# Patient Record
Sex: FEMALE | Race: WHITE | NOT HISPANIC OR LATINO | Employment: FULL TIME | URBAN - METROPOLITAN AREA
[De-identification: names, ages, dates, MRNs, and addresses within clinical notes are randomized per-mention and may not be internally consistent; named-entity substitution may affect disease eponyms.]

---

## 2019-04-29 ENCOUNTER — OFFICE VISIT (OUTPATIENT)
Dept: URGENT CARE | Facility: URGENT CARE | Age: 34
End: 2019-04-29
Payer: COMMERCIAL

## 2019-04-29 VITALS
OXYGEN SATURATION: 98 % | HEART RATE: 79 BPM | DIASTOLIC BLOOD PRESSURE: 74 MMHG | TEMPERATURE: 99.4 F | SYSTOLIC BLOOD PRESSURE: 114 MMHG | WEIGHT: 174 LBS

## 2019-04-29 DIAGNOSIS — R52 ACHES: Primary | ICD-10-CM

## 2019-04-29 LAB
FLUAV+FLUBV AG SPEC QL: NEGATIVE
FLUAV+FLUBV AG SPEC QL: NEGATIVE
SPECIMEN SOURCE: NORMAL

## 2019-04-29 PROCEDURE — 87804 INFLUENZA ASSAY W/OPTIC: CPT | Performed by: FAMILY MEDICINE

## 2019-04-29 PROCEDURE — 99203 OFFICE O/P NEW LOW 30 MIN: CPT | Performed by: FAMILY MEDICINE

## 2019-04-30 NOTE — PATIENT INSTRUCTIONS
If you develop a cough try robitussin DM     Stay hydrated: 4-6 water bottles a day    Take ibuprofen 600mg every 4-6 hours for fevers.     If fevers persist more than 2 days please call you doctor's office for a recommendation    If you see swelling/redness in the groin area please come back to be seen    Return or call if you have new or worsening symptoms

## 2019-04-30 NOTE — PROGRESS NOTES
Subjective:   Arminda Veloz is a 33 year old female who presents for   Chief Complaint   Patient presents with     Urgent Care     Pt in clinic to have eval for sudden onset of fever, aches, and      Generalized Body Aches     Fever     Sweats     Woke up this morning and felt some discomfort in her muscles of lower right abdomen. Developing achiness. Getting home tonight around 530pm she felt achey. Chills ensued. She took a shower and took a 2 hour rest while awake . Denies vomiting/diarrhea. She denies cough. No sore throat. Denies sensitivity to light. No neck pain.   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 Slight groin discomfort on R side earlier tonight that has resolved. Denies any recent increase in activity.No difficulty with running/walking      There are no active problems to display for this patient.    No current outpatient medications on file.     No current facility-administered medications for this visit.      ROS:  As above per HPI    Objective:   /74   Pulse 79   Temp 99.4  F (37.4  C) (Oral)   Wt 78.9 kg (174 lb)   SpO2 98% , There is no height or weight on file to calculate BMI.  Gen:  NAD, well-nourished, sitting in chair comfortably  HEENT: EOMI, sclera anicteric, Head normocephalic, ; nares patent; moist mucous membranes, normal oropharynx without tonsillar enlargement  Neck: trachea midline, no thyromegaly, normal ROM and no nuchal rigidity  CV:  Hemodynamically stable, RRR  Pulm:  no increased work of breathing , CTAB, no wheezes/rales/rhonchi   Extrem: no cyanosis, edema or clubbing  Skin: no obvious rashes or abnormalities  Psych: Euthymic, linear thoughts, normal rate of speech  MSK: 5/5 hip flexion  Gait: normal        Results for orders placed or performed in visit on 04/29/19   Influenza A/B antigen   Result Value Ref Range    Influenza A/B Agn Specimen Nasal     Influenza A  Negative NEG^Negative    Influenza B Negative NEG^Negative       Assessment & Plan:   Arminda Veloz, 33 year old female who presents with:    Body Aches  Maybe experiencing a mild viral illness - showing no respiratory symptoms. Vital signs wnl although she did have ibuprofen recently. Appears well hydrated with good mentation. Care tips provided in AVS on when to return for re-evaluation.   - Influenza A/B antigen      Roberth Lane MD   [facility]    The use of Dragon/[employer] dictation services may have been used to construct the content in this note; any grammatical or spelling errors are non-intentional. Please contact the author of this note directly if you are in need of any clarification.

## 2019-09-11 ENCOUNTER — OFFICE VISIT (OUTPATIENT)
Dept: URGENT CARE | Facility: URGENT CARE | Age: 34
End: 2019-09-11
Payer: COMMERCIAL

## 2019-09-11 VITALS
OXYGEN SATURATION: 98 % | WEIGHT: 174 LBS | TEMPERATURE: 98.5 F | SYSTOLIC BLOOD PRESSURE: 128 MMHG | DIASTOLIC BLOOD PRESSURE: 71 MMHG | HEART RATE: 72 BPM

## 2019-09-11 DIAGNOSIS — J02.9 VIRAL PHARYNGITIS: Primary | ICD-10-CM

## 2019-09-11 DIAGNOSIS — R07.0 THROAT PAIN: ICD-10-CM

## 2019-09-11 LAB
DEPRECATED S PYO AG THROAT QL EIA: NORMAL
SPECIMEN SOURCE: NORMAL

## 2019-09-11 PROCEDURE — 87081 CULTURE SCREEN ONLY: CPT | Performed by: INTERNAL MEDICINE

## 2019-09-11 PROCEDURE — 99213 OFFICE O/P EST LOW 20 MIN: CPT | Performed by: INTERNAL MEDICINE

## 2019-09-11 PROCEDURE — 87880 STREP A ASSAY W/OPTIC: CPT | Performed by: INTERNAL MEDICINE

## 2019-09-11 NOTE — PROGRESS NOTES
SUBJECTIVE:   Arminda Veloz is a 34 year old female presenting with a chief complaint of   Chief Complaint   Patient presents with     Urgent Care     Pharyngitis     c/o sore throat for 1 day       She is an established patient of [facility].    URI Adult    Onset of symptoms was 1 day(s) ago.  Course of illness is worsening.      Current and Associated symptoms: sore throat, headache and fatigue  Treatment measures tried include Tylenol/Ibuprofen.  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        Review of Systems    No past medical history on file.  No family history on file.  No current outpatient medications on file.     Social History     Tobacco Use     Smoking status: Never Smoker     Smokeless tobacco: Never Used   Substance Use Topics     Alcohol use: Not on file       OBJECTIVE  /71   Pulse 72   Temp 98.5  F (36.9  C) (Oral)   Wt 78.9 kg (174 lb)   LMP 09/11/2019   SpO2 98%   Breastfeeding? No     Physical Exam   Constitutional: She appears well-developed and well-nourished.   HENT:   Mouth/Throat: Oropharynx is clear and moist. No oropharyngeal exudate.   Cardiovascular: Normal rate, regular rhythm and normal heart sounds.   Pulmonary/Chest: Effort normal and breath sounds normal.   Lymphadenopathy:     She has cervical adenopathy.   Vitals reviewed.      Labs:  Results for orders placed or performed in visit on 09/11/19 (from the past 24 hour(s))   Strep, Rapid Screen   Result Value Ref Range    Specimen Description Throat     Rapid Strep A Screen       NEGATIVE: No Group A streptococcal antigen detected by immunoassay, await culture report.           ASSESSMENT:      ICD-10-CM    1. Viral pharyngitis J02.9    2. Throat pain R07.0 Strep, Rapid Screen     Beta strep group A culture        PLAN:    URI Adult:  Tylenol, Ibuprofen, Fluids and Rest    Followup:    If not improving or if condition worsens, follow up with your Primary Care Provider

## 2019-09-12 LAB
BACTERIA SPEC CULT: NORMAL
SPECIMEN SOURCE: NORMAL

## 2020-03-11 ENCOUNTER — HEALTH MAINTENANCE LETTER (OUTPATIENT)
Age: 35
End: 2020-03-11

## 2021-01-03 ENCOUNTER — HEALTH MAINTENANCE LETTER (OUTPATIENT)
Age: 36
End: 2021-01-03

## 2021-04-25 ENCOUNTER — HEALTH MAINTENANCE LETTER (OUTPATIENT)
Age: 36
End: 2021-04-25

## 2021-10-10 ENCOUNTER — HEALTH MAINTENANCE LETTER (OUTPATIENT)
Age: 36
End: 2021-10-10

## 2022-02-22 ENCOUNTER — OFFICE VISIT (OUTPATIENT)
Dept: URGENT CARE | Facility: URGENT CARE | Age: 37
End: 2022-02-22
Payer: COMMERCIAL

## 2022-02-22 ENCOUNTER — ANCILLARY PROCEDURE (OUTPATIENT)
Dept: GENERAL RADIOLOGY | Facility: CLINIC | Age: 37
End: 2022-02-22
Attending: FAMILY MEDICINE
Payer: COMMERCIAL

## 2022-02-22 VITALS
SYSTOLIC BLOOD PRESSURE: 123 MMHG | HEIGHT: 64 IN | WEIGHT: 190 LBS | DIASTOLIC BLOOD PRESSURE: 76 MMHG | HEART RATE: 75 BPM | OXYGEN SATURATION: 100 % | BODY MASS INDEX: 32.44 KG/M2

## 2022-02-22 DIAGNOSIS — M25.571 ACUTE RIGHT ANKLE PAIN: ICD-10-CM

## 2022-02-22 DIAGNOSIS — S82.831A OTHER CLOSED FRACTURE OF DISTAL END OF RIGHT FIBULA, INITIAL ENCOUNTER: Primary | ICD-10-CM

## 2022-02-22 DIAGNOSIS — M25.471 SWOLLEN R ANKLE: ICD-10-CM
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
## 2022-02-22 PROCEDURE — 99214 OFFICE O/P EST MOD 30 MIN: CPT | Performed by: FAMILY MEDICINE

## 2022-02-22 PROCEDURE — 73610 X-RAY EXAM OF ANKLE: CPT | Mod: RT | Performed by: RADIOLOGY

## 2022-02-22 RX ORDER — NORETHINDRONE ACETATE AND ETHINYL ESTRADIOL 1MG-20(21)
1 KIT ORAL DAILY
COMMUNITY
Start: 2021-05-21 | End: 2022-05-21

## 2022-02-22 RX ORDER — BUSPIRONE HYDROCHLORIDE 10 MG/1
10 TABLET ORAL
COMMUNITY
Start: 2022-01-05 | End: 2023-01-05

## 2022-02-22 RX ORDER — FLUOXETINE 10 MG/1
30 CAPSULE ORAL
COMMUNITY
Start: 2021-07-01

## 2022-02-22 NOTE — PROGRESS NOTES
Chief Complaint   Patient presents with     Urgent Care     Pt in clinic to have eval for right ankle pain due to fall.     Fall     Initial differential diagnosis included but was not restricted to   Differential Diagnosis:  MS Injury Pain: sprain, fracture, tendonitis, muscle strain and contusion  Medical Decision Making:    ASSESMENT AND PLAN   Arminda was seen today for urgent care and fall.    Diagnoses and all orders for this visit:  Arminda was seen today for urgent care and fall.    Diagnoses and all orders for this visit:    Fall due to slipping on ice or snow, initial encounter    Other closed fracture of distal end of right fibula, initial encounter  -     Ankle/Foot Bracing Supplies Order for DME - ONLY FOR DME  -     Orthopedic  Referral; Future  -     Crutches Order for DME - ONLY FOR DME    Acute right ankle pain  -     XR Ankle Right G/E 3 Views    Swollen R ankle  -     XR Ankle Right G/E 3 Views    Ibuprofen, Rest and ice   Routine discharge counseling was given to the patient and the patient understands that worsening, changing or persistent symptoms should prompt an immediate call or follow up with their primary physician or the emergency department. The importance of appropriate follow up was also discussed with the patient.     I have reviewed the nursing notes.  Review of the result(s) of each unique test -  X-Ray was done, my findings are:fracture of the distal fibula, widening of the ankle joint       Time  spent on the date of the encounter doing chart review, review of test results, interpretation of tests, patient visit and documentation   see orders in Epic  Pt verbalized and agreed with the plan and is aware of the worsening symptoms for which would need to follow up .  Pt was stable during time of discharge from the clinic     SUBJECTIVE     Arminda Veloz is a 36 year old female presenting with a chief complaint of    Chief Complaint   Patient presents with     Urgent Care     Pt in  "clinic to have eval for right ankle pain due to fall.     Fall     MS Injury/Pain    Onset of symptoms was 3 hour(s) ago.  Location: right ankle  Context:       The injury happened while while walking      Mechanism: fall       Patient experienced immediate pain, delayed swelling  Course of symptoms is worsening.    Severity moderate  Current and Associated symptoms: Pain, Swelling, Tenderness and Decreased range of motion  Denies  Redness  Aggravating Factors: walking and weight-bearing  Therapies to improve symptoms include: none  This is the first time this type of problem has occurred for this patient.       No past medical history on file.  Current Outpatient Medications   Medication Sig Dispense Refill     busPIRone (BUSPAR) 10 MG tablet Take 10 mg by mouth       FLUoxetine (PROZAC) 10 MG capsule Take 30 mg by mouth       norethindrone-ethinyl estradiol (JUNEL FE 1/20) 1-20 MG-MCG tablet Take 1 tablet by mouth daily       Social History     Tobacco Use     Smoking status: Never Smoker     Smokeless tobacco: Never Used   Substance Use Topics     Alcohol use: Not on file     No family history on file.      ROS:    10 point ROS of systems including Constitutional, Eyes, Respiratory, Cardiovascular, Gastroenterology, Genitourinary, Integumentary Psychiatric ,neurological were all negative except for pertinent positives noted in my HPI         OBJECTIVE:    /76   Pulse 75   Ht 1.626 m (5' 4\")   Wt 86.2 kg (190 lb)   LMP 02/22/2022   SpO2 100%   BMI 32.61 kg/m    GENERAL APPEARANCE: healthy, alert and no distress  EYES: EOMI,  PERRL, conjunctiva clear  NEURO: Normal strength and tone, sensory exam grossly normal,  normal speech and mentation  PSYCH: mentation appears normal  Right ankle has no erythema, ecchymosis, warmth, or there is swelling  No tenderness over the medial aspect of the ankle or the medial ligaments. There is tenderness over the lateral malleolus  The fifth metatarsal is not tender, nor " is the proximal fibula.  The ankle joint is intact without excessive opening on stressing.  Foot is neurovasculary intact.    Physical Exam      (Note was completed, in part, with [employer] voice-recognition software. Documentation reviewed, but some grammatical, spelling, and word errors may remain.)  Leyla Julio MD on 2/22/2022 at 11:44 AM

## 2022-02-22 NOTE — PATIENT INSTRUCTIONS
Patient Education     Ankle Fracture, Distal Fibula  You have a fracture, or broken bone, of the end of the fibula bone. The fibula is one of two bones that support the ankle joint.     Home care    You will be given a splint, cast, or special boot to prevent movement at the injury site. Do not put weight on a splint. It will break. Follow your healthcare provider s advice about when to begin bearing weight on a cast or boot.    Keep your leg raised when sitting or lying down. When sleeping, place a pillow under the injured leg. When sitting, support the injured leg so it is above heart level. This is very important during the first 48 hours.    Keep the cast or splint completely dry at all times. When bathing, protect the cast or splint with 2 large plastic bags. Place 1 bag outside of the other. Tape each bag with duct tape at the top end or use rubber bands. Water can still leak in even when the foot is covered. So it's best to keep the cast, splint, or boot away from water. If a fiberglass cast or splint gets wet, dry it with a hair dryer on a cool setting.    Place an ice pack over the injured area for no more than 15 to 20 minutes. Do this every 3 to 6 hours for the first 24 to 48 hours. Continue this 3 to 4 times a day as needed. To make an ice pack, put ice cubes in a plastic bag that seals at the top. Wrap the bag in a clean, thin towel or cloth. Never put ice or an ice pack directly on the skin. The ice pack can be put right on the cast or splint. As the ice melts, be careful that the cast or splint doesn t get wet.    You may use over-the-counter pain medicine to control pain, unless another pain medicine was prescribed. If you have chronic liver or kidney disease or ever had a stomach ulcer or GI bleeding, talk with your provider before using these medicines.    Follow-up care  Follow up with your healthcare provider in 1 week, or as advised. This is to be sure the bone is healing properly. If you were  given a splint, it may be changed to a cast or boot after the swelling goes down.   If X-rays were taken, you will be told of any new findings that may affect your care.  When to seek medical advice  Call your healthcare provider right away if any of these occur:    The plaster cast or splint becomes wet or soft    The fiberglass cast or splint stays wet for more than 24 hours    There is increased tightness or pain under the cast or splint    Your toes become swollen, cold, blue, numb, or tingly    The cast or splint becomes loose    The cast or splint has a bad smell    Sore areas develop under the cast or splint    The cast or splint develops cracks or breaks   Yuli last reviewed this educational content on 4/1/2018 2000-2021 [employer]. All rights reserved. This information is not intended as a substitute for professional medical care. Always follow your healthcare professional's instructions.

## 2022-02-24 NOTE — PROGRESS NOTES
S: Patient reports pain began on 2/22/22. Patient states that she slipped on a patch of ice and describes an inversion ankle injury. She heard a crack in the ankle. Patient notes pain is located in right lateral ankle. Pain increases with weight bearing activities and ROM. Pain improves with Advil. Patient reports pain at worst is 4/10 and currently is 2/10. Patient has tried Advil, CAM boot, crutches, elevation. Past orthopedic / surgical history: none.    Patient works as a teacher.       There is no problem list on file for this patient.         No past medical history on file.       No past surgical history on file.     Presented to clinic in [facility] cam boot.         Social History     Tobacco Use     Smoking status: Never Smoker     Smokeless tobacco: Never Used   Substance Use Topics     Alcohol use: Not on file          No family history on file.            Allergies   Allergen Reactions     Codeine Nausea and Vomiting     Penicillins Other (See Comments)     Unknown as to the reaction            Current Outpatient Medications   Medication Sig Dispense Refill     busPIRone (BUSPAR) 10 MG tablet Take 10 mg by mouth       FLUoxetine (PROZAC) 10 MG capsule Take 30 mg by mouth       norethindrone-ethinyl estradiol (JUNEL FE 1/20) 1-20 MG-MCG tablet Take 1 tablet by mouth daily            Review Of Systems  Skin: negative  Eyes: negative  Ears/Nose/Throat: negative  Respiratory: No shortness of breath, dyspnea on exertion, cough, or hemoptysis    O: physical exam:  Skin intact, edema and ecchymosis about ankle.  CMS intact to toes.    Lab:  Non contributory    Images:  IMPRESSION: Acute oblique fracture of the distal fibula at the level  of the tibial plafond with minimal posterior displacement. Surrounding  tissue tissue swelling. There is very subtle widening of the medial  clear space which may suggest ligamentous injury. Punctate osseous  body distal to the medial malleolus, age indeterminate.  No  dislocation.   post cast placement:  Not a true mortise view but no change compared to previous image, stable talus in ankle mortise.         A:  R ankle fracture    P:  Short leg cast x 5 weeks. Remove cast and repeat 3 view ankle then back to cam boot.  Non weight bearing for now then weight bearing in cam boot for 6 weeks after cast removed.  Will notify if exacerbation symptoms.             In addition to the above assessment and plan each active problem on Arminda's problem list was evaluated today. This included the questioning of Arminda for any medication problems. We will continue the current treatment plan for these active problems except as noted.    Cast/splint application    Date/Time: 3/1/2022 5:11 PM  Performed by: Janet Skinner  Authorized by: Sree Diop MD     Consent:     Consent obtained:  Verbal    Consent given by:  Patient    Risks discussed:  Discoloration, numbness, pain and swelling  Pre-procedure details:     Sensation:  Normal  Procedure details:     Laterality:  Right    Location:  Ankle    Ankle:  R ankle    Strapping: no      Cast type:  Short leg    Supplies:  Fiberglass  Post-procedure details:     Sensation:  Normal    Patient tolerance of procedure:  Tolerated well, no immediate complications    Patient provided with cast or splint care instructions: Yes

## 2022-03-01 ENCOUNTER — ANCILLARY PROCEDURE (OUTPATIENT)
Dept: GENERAL RADIOLOGY | Facility: CLINIC | Age: 37
End: 2022-03-01
Attending: ORTHOPAEDIC SURGERY
Payer: COMMERCIAL

## 2022-03-01 ENCOUNTER — OFFICE VISIT (OUTPATIENT)
Dept: ORTHOPEDICS | Facility: CLINIC | Age: 37
End: 2022-03-01
Payer: COMMERCIAL

## 2022-03-01 VITALS — SYSTOLIC BLOOD PRESSURE: 122 MMHG | DIASTOLIC BLOOD PRESSURE: 86 MMHG

## 2022-03-01 DIAGNOSIS — S82.831A OTHER CLOSED FRACTURE OF DISTAL END OF RIGHT FIBULA, INITIAL ENCOUNTER: ICD-10-CM

## 2022-03-01 PROCEDURE — 73600 X-RAY EXAM OF ANKLE: CPT | Mod: RT | Performed by: RADIOLOGY

## 2022-03-01 PROCEDURE — 29405 APPL SHORT LEG CAST: CPT | Mod: RT | Performed by: ORTHOPAEDIC SURGERY

## 2022-03-01 PROCEDURE — 99203 OFFICE O/P NEW LOW 30 MIN: CPT | Mod: 25 | Performed by: ORTHOPAEDIC SURGERY

## 2022-03-01 NOTE — LETTER
3/1/2022         RE: Arminda Veloz  [address]  [place] [address]        Dear Colleague,    Thank you for referring your patient, Arminda Veloz, to the [facility] [place]. Please see a copy of my visit note below.    S: Patient reports pain began on 2/22/22. Patient states that she slipped on a patch of ice and describes an inversion ankle injury. She heard a crack in the ankle. Patient notes pain is located in right lateral ankle. Pain increases with weight bearing activities and ROM. Pain improves with Advil. Patient reports pain at worst is 4/10 and currently is 2/10. Patient has tried Advil, CAM boot, crutches, elevation. Past orthopedic / surgical history: none.    Patient works as a teacher.       There is no problem list on file for this patient.         No past medical history on file.       No past surgical history on file.     [facility]         Social History     Tobacco Use     Smoking status: Never Smoker     Smokeless tobacco: Never Used   Substance Use Topics     Alcohol use: Not on file          No family history on file.            Allergies   Allergen Reactions     Codeine Nausea and Vomiting     Penicillins Other (See Comments)     Unknown as to the reaction            Current Outpatient Medications   Medication Sig Dispense Refill     busPIRone (BUSPAR) 10 MG tablet Take 10 mg by mouth       FLUoxetine (PROZAC) 10 MG capsule Take 30 mg by mouth       norethindrone-ethinyl estradiol (JUNEL FE 1/20) 1-20 MG-MCG tablet Take 1 tablet by mouth daily            Review Of Systems  Skin: negative  Eyes: negative  Ears/Nose/Throat: negative  Respiratory: No shortness of breath, dyspnea on exertion, cough, or hemoptysis    O: physical exam:  Skin intact, edema and ecchymosis about ankle.  CMS intact to toes.    Lab:  Non contributory    Images:  IMPRESSION: Acute oblique fracture of the distal fibula at the level  of the  tibial plafond with minimal posterior displacement. Surrounding  tissue tissue swelling. There is very subtle widening of the medial  clear space which may suggest ligamentous injury. Punctate osseous  body distal to the medial malleolus, age indeterminate. No  dislocation.   post cast placement:  Not a true mortise view but no change compared to previous image, stable talus in ankle mortise.         A:  R ankle fracture    P:  Short leg cast x 5 weeks. Remove cast and repeat 3 view ankle then back to cam boot.  Non weight bearing for now then weight bearing in cam boot for 6 weeks after cast removed.  Will notify if exacerbation symptoms.             In addition to the above assessment and plan each active problem on Arminda's problem list was evaluated today. This included the questioning of Arminda for any medication problems. We will continue the current treatment plan for these active problems except as noted.    Cast/splint application    Date/Time: 3/1/2022 5:11 PM  Performed by: Janet Skinner  Authorized by: Sree Daniel MD     Consent:     Consent obtained:  Verbal    Consent given by:  Patient    Risks discussed:  Discoloration, numbness, pain and swelling  Pre-procedure details:     Sensation:  Normal  Procedure details:     Laterality:  Right    Location:  Ankle    Ankle:  R ankle    Strapping: no      Cast type:  Short leg    Supplies:  Fiberglass  Post-procedure details:     Sensation:  Normal    Patient tolerance of procedure:  Tolerated well, no immediate complications    Patient provided with cast or splint care instructions: Yes              Again, thank you for allowing me to participate in the care of your patient.        Sincerely,        SREE DANIEL MD

## 2022-03-01 NOTE — PATIENT INSTRUCTIONS
Thank you for choosing [facility]    Dr. Diop Locations:    [facility]   [address] [place] [address], [address]  [place] [address] [address] [place] [id number]   Appointments: [phone] Appointments: [phone]   Fax: [phone] Fax: [phone]     Dr. Diop contact: [email or web address] - Please email with any questions.     Follow up: 5 weeks or sooner with acute issues or concerns. Please call [phone] to schedule your follow up appointment.        Caring for Your Cast     A cast is used to protect an injured body part and allow it to heal by limiting the amount of motion occurring around the injury. Pain and swelling of the injured area is normal for 48 hours after your cast is put on. If you have swelling, wiggle your toes or fingers to ease it. Doing so encourages blood flow to your arm or leg.     It is important that you keep your cast dry, unless your doctor tells you differently. If the padding of the cast gets wet, your skin may be damaged and become infected. When showering or taking a bath, put the cast in a heavy plastic bag that can be held in place with a rubber band. If your cast gets wet and does not dry out in four to five hours, call your doctor s office.   To keep the cast clean, use wash clothes or baby wipes around it.   You may experience some itching inside the cast. This is normal. Avoid putting anything in the cast, even your finger, as you can injure your skin and cause infection. Try shaking some talcum powder or blowing cool air from a hair dryer into the cast to ease itching.   If these signs or symptoms develop, call your doctor immediately.       Pain gets worse     Swelling that cuts off blood flow that does not go away, even when you lift the body part above the level of your heart     Fever after itching. It may be related to an infection.     Fluid  draining from your skin under the cast     Your cast may become loose as swelling goes down. If the cast feels too loose or if it is so loose you can take it off, call your doctor s office.     Your doctor or  will give you recommendations for activity based on your injury. Some sports allow casts if properly padded by a doctor or .     For complete healing, your cast should only be removed at the direction of your doctor or clinic staff. A special saw ensures its safe removal and protects the skin and other tissue under the cast.

## 2022-03-03 ENCOUNTER — TELEPHONE (OUTPATIENT)
Dept: ORTHOPEDICS | Facility: CLINIC | Age: 37
End: 2022-03-03
Payer: COMMERCIAL

## 2022-03-03 DIAGNOSIS — S82.831A CLOSED FRACTURE OF DISTAL END OF RIGHT FIBULA: Primary | ICD-10-CM

## 2022-03-03 NOTE — TELEPHONE ENCOUNTER
" Patient calls stating she saw Dr. Diop on 3/1/22 for closed fracture of distal end of right fibula and has been using crutches. Crutches have been working ok at home but she does not feel safe using them at school. 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 She would like an order for a knee scooter.     Will discuss with provider and get back with her after obtaining an order from provider. She will then need to contact [facility] to obtain.   She verbalized understanding.     Ok to leave message : YES    Please see DME order pending for knee scooter. Please sign if appropriate.     MO Mcgraw RN    "

## 2022-03-04 NOTE — TELEPHONE ENCOUNTER
DME order for knee scooter was signed at the direction of Dr Diop.      LANDON with detailed information regarding that DME orders had been signed and contact info for [facility] was provided to the patient.    Jatinder Ochoa ATC

## 2022-03-29 ENCOUNTER — TELEPHONE (OUTPATIENT)
Dept: ORTHOPEDICS | Facility: CLINIC | Age: 37
End: 2022-03-29
Payer: COMMERCIAL

## 2022-04-07 DIAGNOSIS — S82.831A CLOSED FRACTURE OF DISTAL END OF RIGHT FIBULA: Primary | ICD-10-CM

## 2022-04-08 ENCOUNTER — OFFICE VISIT (OUTPATIENT)
Dept: ORTHOPEDICS | Facility: CLINIC | Age: 37
End: 2022-04-08
Payer: COMMERCIAL

## 2022-04-08 ENCOUNTER — ANCILLARY PROCEDURE (OUTPATIENT)
Dept: GENERAL RADIOLOGY | Facility: CLINIC | Age: 37
End: 2022-04-08
Attending: ORTHOPAEDIC SURGERY
Payer: COMMERCIAL

## 2022-04-08 VITALS — WEIGHT: 190 LBS | BODY MASS INDEX: 32.44 KG/M2 | HEIGHT: 64 IN

## 2022-04-08 DIAGNOSIS — S82.831A CLOSED FRACTURE OF DISTAL END OF RIGHT FIBULA: ICD-10-CM

## 2022-04-08 DIAGNOSIS — S82.891D CLOSED FRACTURE OF RIGHT ANKLE WITH ROUTINE HEALING, SUBSEQUENT ENCOUNTER: Primary | ICD-10-CM

## 2022-04-08 PROCEDURE — 73610 X-RAY EXAM OF ANKLE: CPT | Mod: RT | Performed by: RADIOLOGY

## 2022-04-08 PROCEDURE — 99212 OFFICE O/P EST SF 10 MIN: CPT | Performed by: ORTHOPAEDIC SURGERY

## 2022-04-08 NOTE — LETTER
4/8/2022         RE: Arminda Veloz  [address]  [place] [address]        Dear Colleague,    Thank you for referring your patient, Arminda Veloz, to the [facility] [place]. Please see a copy of my visit note below.    S:Foot and ankle doing well.  No issues in cast. Did bother her back some.         There is no problem list on file for this patient.         No past medical history on file.       No past surgical history on file.         Social History     Tobacco Use     Smoking status: Never Smoker     Smokeless tobacco: Never Used   Substance Use Topics     Alcohol use: Not on file          No family history on file.            Allergies   Allergen Reactions     Codeine Nausea and Vomiting     Penicillins Other (See Comments)     Unknown as to the reaction            Current Outpatient Medications   Medication Sig Dispense Refill     busPIRone (BUSPAR) 10 MG tablet Take 10 mg by mouth       FLUoxetine (PROZAC) 10 MG capsule Take 30 mg by mouth       norethindrone-ethinyl estradiol (JUNEL FE 1/20) 1-20 MG-MCG tablet Take 1 tablet by mouth daily            Review Of Systems  Skin: negative  Eyes: negative  Ears/Nose/Throat: negative  Respiratory: No shortness of breath, dyspnea on exertion, cough, or hemoptysis    O: Physical Exam:  Less edema, no ecchymosis.  CMS intact to foot/toes.  No evidence pressure necrosis.    Images:  Anatomic orientation of talus in ankle mortise.  Adequate subperiosteal and endosteal new bone formation.         A:  R ankle reeves B    P:  Discontinue cast and use Cam boot.  Weight bear as tolerated in boot x 6 weeks.  See back in 6 weeks with new images.  See PT for progressive ankle exercise.  Notify if exacerbation symptoms.           In addition to the above assessment and plan each active problem on Arminda's problem list was evaluated today. This included the questioning of Arminda for any medication problems. We will continue the current  treatment plan for these active problems except as noted.    BRAD DANIEL MD

## 2022-04-08 NOTE — PROGRESS NOTES
S:Foot and ankle doing well.  No issues in cast. Did bother her back some.         There is no problem list on file for this patient.         No past medical history on file.       No past surgical history on file.         Social History     Tobacco Use     Smoking status: Never Smoker     Smokeless tobacco: Never Used   Substance Use Topics     Alcohol use: Not on file          No family history on file.            Allergies   Allergen Reactions     Codeine Nausea and Vomiting     Penicillins Other (See Comments)     Unknown as to the reaction            Current Outpatient Medications   Medication Sig Dispense Refill     busPIRone (BUSPAR) 10 MG tablet Take 10 mg by mouth       FLUoxetine (PROZAC) 10 MG capsule Take 30 mg by mouth       norethindrone-ethinyl estradiol (JUNEL FE 1/20) 1-20 MG-MCG tablet Take 1 tablet by mouth daily            Review Of Systems  Skin: negative  Eyes: negative  Ears/Nose/Throat: negative  Respiratory: No shortness of breath, dyspnea on exertion, cough, or hemoptysis    O: Physical Exam:  Less edema, no ecchymosis.  CMS intact to foot/toes.  No evidence pressure necrosis.    Images:  Anatomic orientation of talus in ankle mortise.  Adequate subperiosteal and endosteal new bone formation.         A:  R ankle reeves B    P:  Discontinue cast and use Cam boot.  Weight bear as tolerated in boot x 6 weeks.  See back in 6 weeks with new images.  See PT for progressive ankle exercise.  Notify if exacerbation symptoms.           In addition to the above assessment and plan each active problem on Arminda's problem list was evaluated today. This included the questioning of Arminda for any medication problems. We will continue the current treatment plan for these active problems except as noted.

## 2022-04-25 ENCOUNTER — THERAPY VISIT (OUTPATIENT)
Dept: PHYSICAL THERAPY | Facility: CLINIC | Age: 37
End: 2022-04-25
Attending: ORTHOPAEDIC SURGERY
Payer: COMMERCIAL

## 2022-04-25 DIAGNOSIS — M25.571 ACUTE RIGHT ANKLE PAIN: ICD-10-CM

## 2022-04-25 PROCEDURE — 97161 PT EVAL LOW COMPLEX 20 MIN: CPT | Mod: GP | Performed by: PHYSICAL THERAPIST

## 2022-04-25 PROCEDURE — 97110 THERAPEUTIC EXERCISES: CPT | Mod: GP | Performed by: PHYSICAL THERAPIST

## 2022-04-25 PROCEDURE — 97140 MANUAL THERAPY 1/> REGIONS: CPT | Mod: GP | Performed by: PHYSICAL THERAPIST

## 2022-05-03 ENCOUNTER — THERAPY VISIT (OUTPATIENT)
Dept: PHYSICAL THERAPY | Facility: CLINIC | Age: 37
End: 2022-05-03
Payer: COMMERCIAL

## 2022-05-03 DIAGNOSIS — M25.571 ACUTE RIGHT ANKLE PAIN: Primary | ICD-10-CM

## 2022-05-03 PROCEDURE — 97110 THERAPEUTIC EXERCISES: CPT | Mod: GP | Performed by: PHYSICAL THERAPIST

## 2022-05-03 PROCEDURE — 97140 MANUAL THERAPY 1/> REGIONS: CPT | Mod: GP | Performed by: PHYSICAL THERAPIST

## 2022-05-12 ENCOUNTER — THERAPY VISIT (OUTPATIENT)
Dept: PHYSICAL THERAPY | Facility: CLINIC | Age: 37
End: 2022-05-12
Payer: COMMERCIAL

## 2022-05-12 DIAGNOSIS — M25.571 ACUTE RIGHT ANKLE PAIN: Primary | ICD-10-CM

## 2022-05-12 PROCEDURE — 97110 THERAPEUTIC EXERCISES: CPT | Mod: GP | Performed by: PHYSICAL THERAPIST

## 2022-05-12 PROCEDURE — 97140 MANUAL THERAPY 1/> REGIONS: CPT | Mod: GP | Performed by: PHYSICAL THERAPIST

## 2022-05-21 ENCOUNTER — HEALTH MAINTENANCE LETTER (OUTPATIENT)
Age: 37
End: 2022-05-21

## 2022-05-23 ENCOUNTER — THERAPY VISIT (OUTPATIENT)
Dept: PHYSICAL THERAPY | Facility: CLINIC | Age: 37
End: 2022-05-23
Payer: COMMERCIAL

## 2022-05-23 DIAGNOSIS — M25.571 ACUTE RIGHT ANKLE PAIN: Primary | ICD-10-CM

## 2022-05-23 PROCEDURE — 97110 THERAPEUTIC EXERCISES: CPT | Mod: GP | Performed by: PHYSICAL THERAPIST

## 2022-05-23 PROCEDURE — 97140 MANUAL THERAPY 1/> REGIONS: CPT | Mod: GP | Performed by: PHYSICAL THERAPIST

## 2022-06-01 ENCOUNTER — THERAPY VISIT (OUTPATIENT)
Dept: PHYSICAL THERAPY | Facility: CLINIC | Age: 37
End: 2022-06-01
Payer: COMMERCIAL

## 2022-06-01 DIAGNOSIS — M25.571 ACUTE RIGHT ANKLE PAIN: Primary | ICD-10-CM

## 2022-06-01 PROCEDURE — 97140 MANUAL THERAPY 1/> REGIONS: CPT | Mod: GP | Performed by: PHYSICAL THERAPIST

## 2022-06-01 PROCEDURE — 97112 NEUROMUSCULAR REEDUCATION: CPT | Mod: GP | Performed by: PHYSICAL THERAPIST

## 2022-06-01 PROCEDURE — 97110 THERAPEUTIC EXERCISES: CPT | Mod: GP | Performed by: PHYSICAL THERAPIST

## 2022-07-28 PROBLEM — M25.571 ACUTE RIGHT ANKLE PAIN: Status: RESOLVED | Noted: 2022-04-25 | Resolved: 2022-07-28

## 2022-07-28 NOTE — PROGRESS NOTES
Discharge Note    Progress reporting period is from initial evaluation date (please see noted date below) to Jun 1, 2022.  Linked Episodes   Type: Episode: Status: Noted: Resolved: Last update: Updated by:   PHYSICAL THERAPY Right Ankle 4/22 Active 4/25/2022 6/1/2022  7:14 AM Smiley Jeong PT      Comments:       Arminda failed to follow up and current status is unknown.  Please see information below for last relevant information on current status.  Patient seen for 5 visits.    SUBJECTIVE  Subjective changes noted by patient:  Patient states that her foot is doing well. She does get some soreness towards the end of the day, but this does not linger. She states she also feels like her foot isn't as swollen.  .  Current pain level is 0/10.     Previous pain level was   .   Changes in function:  Yes (See Goal flowsheet attached for changes in current functional level)  Adverse reaction to treatment or activity: None    OBJECTIVE  Changes noted in objective findings: DF: 10     ASSESSMENT/PLAN  Diagnosis: Right ankle fracture   Updated problem list and treatment plan:   Pain - HEP  Decreased ROM/flexibility - HEP  Decreased function - HEP  STG/LTGs have been met or progress has been made towards goals:  Yes, please see goal flowsheet for most current information  Assessment of Progress: current status is unknown.    Last current status:     Self Management Plans:  HEP  I have re-evaluated this patient and find that the nature, scope, duration and intensity of the therapy is appropriate for the medical condition of the patient.  Arminda continues to require the following intervention to meet STG and LTG's:  HEP.    Recommendations:  Discharge with current home program.  Patient to follow up with MD as needed.    Please refer to the daily flowsheet for treatment today, total treatment time and time spent performing 1:1 timed codes.

## 2022-09-18 ENCOUNTER — HEALTH MAINTENANCE LETTER (OUTPATIENT)
Age: 37
End: 2022-09-18

## 2023-06-04 ENCOUNTER — HEALTH MAINTENANCE LETTER (OUTPATIENT)
Age: 38
End: 2023-06-04

## 2024-01-25 NOTE — PROGRESS NOTES
Physical Therapy Initial Evaluation: Subjective History  Date of Injury: 2/22/22.    Injury/Limb: Fibula fracture  Surgeon Name: Dr. Diop  Average Daily Pain Levels: 2-3/10 (Location: top of her foot; Quality: Aching/Throbbing)  Other Symptoms: Denies numbness, tingling.   Symptom Mgmt Strategies: Boot/cast  Prior orthopaedic history/procedures: See Epic  Prior non-operative management: See Epic  Next MD Appt Date: 5/20 (likely out of the boot at this time)    Functional limitations following procedure: walking, standing, stairs  Previous level of function: Independent    Patient Employment: Special  at [employer]  Typical Physical Activities: Walking, no regular exercise routine      Post-Operative Physical Therapy Examination    Physical Mobility Status  Gait: arrive in clinic with CAM boot on, WBAT  Transfers:  Independent, put weight on foot out of CAM boot when transferring from chair to table    Anthropometric Measures     Right Left Difference   Joint ROM      Dorsiflexion 2 deg Active  5 deg  Passive 11 deg 6 deg   Plantarflexion 30 deg 45 deg 15 deg   Inversion 15 deg 25 deg 20 deg   Eversion 5 deg 15 deg 10 deg   Circumferential Measures      Figure 8 48 cm 46 cm 2 cm   15 cm Prox - cm - cm - cm       Assessment/Plan    Patient is a 36 year old female with right side ankle complaints.    Patient has the following significant findings with corresponding treatment plan.                Diagnosis 1:  Right Ankle Fracture  Pain -  hot/cold therapy, manual therapy, self management and education  Decreased ROM/flexibility - manual therapy, therapeutic exercise and home program  Decreased joint mobility - manual therapy, therapeutic exercise and home program  Decreased strength - therapeutic exercise, therapeutic activities and home program  Impaired balance - neuro re-education, therapeutic activities and home program  Decreased proprioception - neuro re-education, therapeutic activities and home  Relayed message to mother  Thank you,  Halima BANUELOS    [phone]    program  [place] - cold therapy  Decreased function - therapeutic activities and home program    Therapy Evaluation Codes:   1) History comprised of:   Personal factors that impact the plan of care:      None.    Comorbidity factors that impact the plan of care are:      None.     Medications impacting care: None.  2) Examination of Body Systems comprised of:   Body structures and functions that impact the plan of care:      Ankle.   Activity limitations that impact the plan of care are:      Sitting, Squatting/kneeling and Working.  3) Clinical presentation characteristics are:   Stable/Uncomplicated.  4) Decision-Making    Low complexity using standardized patient assessment instrument and/or measureable assessment of functional outcome.  Cumulative Therapy Evaluation is: Low complexity.    Previous and current functional limitations:  (See Goal Flow Sheet for this information)    Short term and Long term goals: (See Goal Flow Sheet for this information)     Communication ability:  Patient appears to be able to clearly communicate and understand verbal and written communication and follow directions correctly.  Treatment Explanation - The following has been discussed with the patient:   RX ordered/plan of care  Anticipated outcomes  Possible risks and side effects  This patient would benefit from PT intervention to resume normal activities.   Rehab potential is good.    Frequency:  1 X week, once daily  Duration:  for 4 weeks then move to every other week for 2 months  Discharge Plan:  Achieve all LTG.  Independent in home treatment program.  Reach maximal therapeutic benefit.    Please refer to the daily flowsheet for treatment today, total treatment time and time spent performing 1:1 timed codes.     Answers for HPI/ROS submitted by the patient on 4/22/2022  Reason for Visit:: physical therapy for broken ankle (right leg)  When problem began:: 2/22/2022  How problem occurred:: slipped on the ice  Number scale:  3/10  General health as reported by patient: fair  Other Allergies Detail: penicillin and codeine  Surgeries: none  Medications you are currently taking: anti-depressants, other  Other Meds Detail: vitamins (calcium, turmeric, fish oil)  Occupation:: teacher  What are your primary job tasks: prolonged standing

## 2024-07-14 ENCOUNTER — HEALTH MAINTENANCE LETTER (OUTPATIENT)
Age: 39
End: 2024-07-14